# Patient Record
Sex: FEMALE | Race: WHITE | NOT HISPANIC OR LATINO | Employment: FULL TIME
[De-identification: names, ages, dates, MRNs, and addresses within clinical notes are randomized per-mention and may not be internally consistent; named-entity substitution may affect disease eponyms.]

---

## 2017-07-01 ENCOUNTER — HEALTH MAINTENANCE LETTER (OUTPATIENT)
Age: 37
End: 2017-07-01

## 2017-08-08 ENCOUNTER — APPOINTMENT (OUTPATIENT)
Dept: LAB | Facility: CLINIC | Age: 37
End: 2017-08-08
Attending: NURSE PRACTITIONER
Payer: COMMERCIAL

## 2017-08-08 ENCOUNTER — OFFICE VISIT (OUTPATIENT)
Dept: OBGYN | Facility: CLINIC | Age: 37
End: 2017-08-08
Attending: NURSE PRACTITIONER
Payer: COMMERCIAL

## 2017-08-08 VITALS — DIASTOLIC BLOOD PRESSURE: 87 MMHG | SYSTOLIC BLOOD PRESSURE: 133 MMHG | HEART RATE: 72 BPM

## 2017-08-08 DIAGNOSIS — Z11.3 SCREEN FOR STD (SEXUALLY TRANSMITTED DISEASE): Primary | ICD-10-CM

## 2017-08-08 LAB
HIV 1+2 AB+HIV1 P24 AG SERPL QL IA: NORMAL
T PALLIDUM IGG+IGM SER QL: NEGATIVE

## 2017-08-08 PROCEDURE — 87389 HIV-1 AG W/HIV-1&-2 AB AG IA: CPT | Performed by: NURSE PRACTITIONER

## 2017-08-08 PROCEDURE — 87491 CHLMYD TRACH DNA AMP PROBE: CPT | Performed by: NURSE PRACTITIONER

## 2017-08-08 PROCEDURE — 86780 TREPONEMA PALLIDUM: CPT | Performed by: NURSE PRACTITIONER

## 2017-08-08 PROCEDURE — 87591 N.GONORRHOEAE DNA AMP PROB: CPT | Performed by: NURSE PRACTITIONER

## 2017-08-08 PROCEDURE — 36415 COLL VENOUS BLD VENIPUNCTURE: CPT | Performed by: NURSE PRACTITIONER

## 2017-08-08 PROCEDURE — 99212 OFFICE O/P EST SF 10 MIN: CPT | Mod: ZF

## 2017-08-08 NOTE — PROGRESS NOTES
SUBJECTIVE:  Kimberly Landry is an 37 year old  Female, , here for STD screen  Denies any symptoms. Reports  and her  a year ago and now back together.   Pap 2014 WNL.Negative HPV. Annual     Menstrual History:  Menstrual History 2011   LAST MENSTRUAL PERIOD 10/6/2011 10/6/2011 2017       Past Medical History:   Diagnosis Date     Depression     intermittent throughout life without addressing it     IUD (intrauterine device) in place 2012    Mirena     Menarche age 16        OBJECTIVE:   /87 (BP Location: Left arm, Patient Position: Chair, Cuff Size: Adult Regular)  Pulse 72  LMP 2017 (Approximate)     She appears well, afebrile.    Pelvic Exam:  EG/BUS: Normal genitalia and Bartholin's, Urethra, [place]'s normal    Vagina: moist, pink, rugae with creamy, white and odorlesssecretions  Cervix: no lesions      ASSESSMENT:   Encounter Diagnosis   Name Primary?     Screen for STD (sexually transmitted disease) Yes        PLAN:   Orders Placed This Encounter   Procedures     Anti Treponema     HIV Antigen Antibody Combo     GC and Chlamydia culture.     [facility]    Alessandra GILMAN

## 2017-08-08 NOTE — NURSING NOTE
Chief Complaint   Patient presents with     Consult For     STD screening       Meenakshi Shahid, SHERLEY 8/8/2017

## 2017-08-08 NOTE — MR AVS SNAPSHOT
After Visit Summary   8/8/2017    Kimberly Landry    MRN: [id number]           Patient Information     Date Of Birth          1980        Visit Information        Provider Department      8/8/2017 9:00 AM Alessandra Sanchez APRN [facility]        Today's Diagnoses     Screen for STD (sexually transmitted disease)    -  1       Follow-ups after your visit        Your next 10 appointments already scheduled     Aug 08, 2017  9:45 AM CDT   LAB with OP LAB UR   [facility], [place], Laboratory Services ([facility])    [address]  [place] [address] [phone]           Patient must bring picture ID. Patient should be prepared to give a urine specimen  Please do not eat 10-12 hours before your appointment if you are coming in fasting for labs on lipids, cholesterol, or glucose (sugar). Pregnant women should follow their Care Team instructions. Water with medications is okay. Do not drink coffee or other fluids. If you have concerns about taking  your medications, please ask at office or if scheduling via [employer], send a message by clicking on Secure Messaging, Message Your Care Team.              Who to contact     Please call your clinic at [phone] to:    Ask questions about your health    Make or cancel appointments    Discuss your medicines    Learn about your test results    Speak to your doctor   If you have compliments or concerns about an experience at your clinic, or if you wish to file a complaint, please contact [facility] Patient Relations at [phone] or email us at [email or web address]         Additional Information About Your Visit        [employer] Information     [employer] gives you secure access to your electronic health record. If you see a primary care provider, you can also send messages to your care team and make appointments. If you have questions, please call  your primary care clinic.  If you do not have a primary care provider, please call [phone] and they will assist you.      [employer] is an electronic gateway that provides easy, online access to your medical records. With [employer], you can request a clinic appointment, read your test results, renew a prescription or communicate with your care team.     To access your existing account, please contact your [facility] or call [phone] for assistance.        Care EveryWhere ID     This is your Care EveryWhere ID. This could be used by other organizations to access your [place] medical records  [id number]        Your Vitals Were     Pulse Last Period                72 08/02/2017 (Approximate)           Blood Pressure from Last 3 Encounters:   08/08/17 133/87   10/31/16 109/69   01/09/14 117/54    Weight from Last 3 Encounters:   10/31/16 70.3 kg (155 lb)   01/09/14 67.4 kg (148 lb 11.2 oz)   04/15/13 65 kg (143 lb 6.4 oz)              We Performed the Following     Anti Treponema     Chlamydia trachomatis PCR (Clinic Collect)     HIV Antigen Antibody Combo     Neisseria gonorrhoeae PCR        Primary Care Provider    Physician No Ref-Primary       No address on file        Equal Access to Services     ERIC MONSIVAIS : Hadii praful barbao Cory, waaxda luqadaha, qaybta kaalmada adebladimiryada, missy valdes. So [facility] [place] [phone].    ATENCIÓN: Si habla español, tiene a diop disposición servicios gratuitos de asistencia lingüística. Llame al [phone].    We comply with applicable federal civil rights laws and [place] laws. We do not discriminate on the basis of race, color, national origin, age, disability sex, sexual orientation or gender identity.            Thank you!     Thank you for choosing [facility]  for your care. Our goal is always to provide you with excellent care. Hearing back from our patients is one way we can continue  to improve our services. Please take a few minutes to complete the written survey that you may receive in the mail after your visit with us. Thank you!             Your Updated Medication List - Protect others around you: Learn how to safely use, store and throw away your medicines at www.disposemymeds.org.      Notice  As of 8/8/2017  9:44 AM    You have not been prescribed any medications.

## 2017-08-08 NOTE — LETTER
2017       RE: Kimberly Landry  [address] [place] [place] [facility] [address]     Dear Colleague,    Thank you for referring your patient, Kimberly Landry, to the [facility] at [facility]. Please see a copy of my visit note below.      SUBJECTIVE:  Kimberly Landry is an 37 year old  Female, , here for STD screen  Denies any symptoms. Reports  and her  a year ago and now back together.   Pap 2014 WNL.Negative HPV. Annual     Menstrual History:  Menstrual History 2011   LAST MENSTRUAL PERIOD 10/6/2011 10/6/2011 2017       Past Medical History:   Diagnosis Date     Depression     intermittent throughout life without addressing it     IUD (intrauterine device) in place 2012    Mirena     Menarche age 16        OBJECTIVE:   /87 (BP Location: Left arm, Patient Position: Chair, Cuff Size: Adult Regular)  Pulse 72  LMP 2017 (Approximate)     She appears well, afebrile.    Pelvic Exam:  EG/BUS: Normal genitalia and Bartholin's, Urethra, [place]'s normal    Vagina: moist, pink, rugae with creamy, white and odorlesssecretions  Cervix: no lesions      ASSESSMENT:   Encounter Diagnosis   Name Primary?     Screen for STD (sexually transmitted disease) Yes        PLAN:   Orders Placed This Encounter   Procedures     Anti Treponema     HIV Antigen Antibody Combo     GC and Chlamydia culture.     [facility]    Alessandra GILMAN

## 2017-08-09 LAB
C TRACH DNA SPEC QL NAA+PROBE: NORMAL
N GONORRHOEA DNA SPEC QL NAA+PROBE: NORMAL
SPECIMEN SOURCE: NORMAL
SPECIMEN SOURCE: NORMAL

## 2017-08-10 NOTE — PROGRESS NOTES
Dear Kimberly.    Your STD screening results were all negative.    Sincerely,    Alessandra GILMAN

## 2018-03-06 ENCOUNTER — TRANSFERRED RECORDS (OUTPATIENT)
Dept: HEALTH INFORMATION MANAGEMENT | Facility: CLINIC | Age: 38
End: 2018-03-06

## 2019-10-03 ENCOUNTER — HEALTH MAINTENANCE LETTER (OUTPATIENT)
Age: 39
End: 2019-10-03

## 2020-11-07 ENCOUNTER — HEALTH MAINTENANCE LETTER (OUTPATIENT)
Age: 40
End: 2020-11-07

## 2020-12-08 DIAGNOSIS — M34.1 CREST SYNDROME (H): Primary | ICD-10-CM

## 2020-12-23 ENCOUNTER — HOSPITAL ENCOUNTER (OUTPATIENT)
Dept: CARDIAC REHAB | Facility: CLINIC | Age: 40
End: 2020-12-23
Attending: INTERNAL MEDICINE
Payer: COMMERCIAL

## 2020-12-23 DIAGNOSIS — M34.1 CREST SYNDROME (H): ICD-10-CM

## 2020-12-23 PROCEDURE — 94726 PLETHYSMOGRAPHY LUNG VOLUMES: CPT

## 2020-12-23 PROCEDURE — 94729 DIFFUSING CAPACITY: CPT

## 2020-12-23 PROCEDURE — 94060 EVALUATION OF WHEEZING: CPT

## 2020-12-23 PROCEDURE — 999N000160 HC STATISTIC RESPIRATORY TESTING VISIT

## 2021-01-04 LAB
DLCOUNC-%PRED-PRE: 104 %
DLCOUNC-PRE: 27.31 ML/MIN/MMHG
DLCOUNC-PRED: 26.15 ML/MIN/MMHG
ERV-%PRED-PRE: 123 %
ERV-PRE: 1.66 L
ERV-PRED: 1.35 L
EXPTIME-PRE: 6.45 SEC
FEF2575-%PRED-POST: 90 %
FEF2575-%PRED-PRE: 68 %
FEF2575-POST: 3.23 L/SEC
FEF2575-PRE: 2.45 L/SEC
FEF2575-PRED: 3.57 L/SEC
FEFMAX-%PRED-PRE: 97 %
FEFMAX-PRE: 7.66 L/SEC
FEFMAX-PRED: 7.85 L/SEC
FEV1-%PRED-PRE: 98 %
FEV1-PRE: 3.54 L
FEV1FEV6-PRE: 70 %
FEV1FEV6-PRED: 84 %
FEV1FVC-PRE: 70 %
FEV1FVC-PRED: 82 %
FEV1SVC-PRE: 70 %
FEV1SVC-PRED: 82 %
FIFMAX-PRE: 6.88 L/SEC
FRCPLETH-%PRED-PRE: 136 %
FRCPLETH-PRE: 4.07 L
FRCPLETH-PRED: 2.99 L
FVC-%PRED-PRE: 114 %
FVC-PRE: 5.08 L
FVC-PRED: 4.44 L
IC-%PRED-PRE: 111 %
IC-PRE: 3.38 L
IC-PRED: 3.04 L
RVPLETH-%PRED-PRE: 131 %
RVPLETH-PRE: 2.41 L
RVPLETH-PRED: 1.84 L
TLCPLETH-%PRED-PRE: 127 %
TLCPLETH-PRE: 7.45 L
TLCPLETH-PRED: 5.86 L
VA-%PRED-PRE: 111 %
VA-PRE: 6.86 L
VC-%PRED-PRE: 114 %
VC-PRE: 5.04 L
VC-PRED: 4.39 L

## 2021-03-15 ENCOUNTER — IMMUNIZATION (OUTPATIENT)
Dept: NURSING | Facility: CLINIC | Age: 41
End: 2021-03-15
Payer: COMMERCIAL

## 2021-03-15 PROCEDURE — 91300 PR COVID VAC PFIZER DIL RECON 30 MCG/0.3 ML IM: CPT

## 2021-03-15 PROCEDURE — 0001A PR COVID VAC PFIZER DIL RECON 30 MCG/0.3 ML IM: CPT

## 2021-04-05 ENCOUNTER — IMMUNIZATION (OUTPATIENT)
Dept: NURSING | Facility: CLINIC | Age: 41
End: 2021-04-05
Attending: INTERNAL MEDICINE
Payer: COMMERCIAL

## 2021-04-05 PROCEDURE — 0002A PR COVID VAC PFIZER DIL RECON 30 MCG/0.3 ML IM: CPT

## 2021-04-05 PROCEDURE — 91300 PR COVID VAC PFIZER DIL RECON 30 MCG/0.3 ML IM: CPT

## 2021-09-05 ENCOUNTER — HEALTH MAINTENANCE LETTER (OUTPATIENT)
Age: 41
End: 2021-09-05

## 2021-12-26 ENCOUNTER — HEALTH MAINTENANCE LETTER (OUTPATIENT)
Age: 41
End: 2021-12-26

## 2022-02-14 ENCOUNTER — TRANSFERRED RECORDS (OUTPATIENT)
Dept: HEALTH INFORMATION MANAGEMENT | Facility: CLINIC | Age: 42
End: 2022-02-14

## 2022-02-16 ENCOUNTER — MEDICAL CORRESPONDENCE (OUTPATIENT)
Dept: HEALTH INFORMATION MANAGEMENT | Facility: CLINIC | Age: 42
End: 2022-02-16
Payer: COMMERCIAL

## 2022-03-03 ENCOUNTER — TRANSCRIBE ORDERS (OUTPATIENT)
Dept: OTHER | Age: 42
End: 2022-03-03

## 2022-03-03 DIAGNOSIS — I27.20 PULMONARY HYPERTENSION (H): Primary | ICD-10-CM

## 2022-08-18 ENCOUNTER — MEDICAL CORRESPONDENCE (OUTPATIENT)
Dept: HEALTH INFORMATION MANAGEMENT | Facility: CLINIC | Age: 42
End: 2022-08-18

## 2022-10-05 ENCOUNTER — ANCILLARY PROCEDURE (OUTPATIENT)
Dept: CT IMAGING | Facility: CLINIC | Age: 42
End: 2022-10-05
Attending: INTERNAL MEDICINE
Payer: COMMERCIAL

## 2022-10-05 DIAGNOSIS — M34.1 CREST SYNDROME (H): ICD-10-CM

## 2022-10-05 DIAGNOSIS — I27.20 PULMONARY HYPERTENSION (H): ICD-10-CM

## 2022-10-05 PROCEDURE — 94375 RESPIRATORY FLOW VOLUME LOOP: CPT | Performed by: INTERNAL MEDICINE

## 2022-10-05 PROCEDURE — 71250 CT THORAX DX C-: CPT | Mod: GC | Performed by: RADIOLOGY

## 2022-10-05 PROCEDURE — 94726 PLETHYSMOGRAPHY LUNG VOLUMES: CPT | Performed by: INTERNAL MEDICINE

## 2022-10-05 PROCEDURE — 94729 DIFFUSING CAPACITY: CPT | Performed by: INTERNAL MEDICINE

## 2022-10-06 LAB
DLCOUNC-%PRED-PRE: 122 %
DLCOUNC-PRE: 31.83 ML/MIN/MMHG
DLCOUNC-PRED: 25.97 ML/MIN/MMHG
ERV-%PRED-PRE: 147 %
ERV-PRE: 1.95 L
ERV-PRED: 1.33 L
EXPTIME-PRE: 6.92 SEC
FEF2575-%PRED-PRE: 75 %
FEF2575-PRE: 2.63 L/SEC
FEF2575-PRED: 3.47 L/SEC
FEFMAX-%PRED-PRE: 93 %
FEFMAX-PRE: 7.32 L/SEC
FEFMAX-PRED: 7.82 L/SEC
FEV1-%PRED-PRE: 103 %
FEV1-PRE: 3.64 L
FEV1FEV6-PRE: 72 %
FEV1FEV6-PRED: 84 %
FEV1FVC-PRE: 71 %
FEV1FVC-PRED: 81 %
FEV1SVC-PRE: 73 %
FEV1SVC-PRED: 81 %
FIFMAX-PRE: 6.14 L/SEC
FRCPLETH-%PRED-PRE: 126 %
FRCPLETH-PRE: 3.79 L
FRCPLETH-PRED: 3 L
FVC-%PRED-PRE: 116 %
FVC-PRE: 5.13 L
FVC-PRED: 4.39 L
IC-%PRED-PRE: 100 %
IC-PRE: 3.05 L
IC-PRED: 3.02 L
RVPLETH-%PRED-PRE: 98 %
RVPLETH-PRE: 1.84 L
RVPLETH-PRED: 1.87 L
TLCPLETH-%PRED-PRE: 116 %
TLCPLETH-PRE: 6.83 L
TLCPLETH-PRED: 5.86 L
VA-%PRED-PRE: 107 %
VA-PRE: 6.6 L
VC-%PRED-PRE: 114 %
VC-PRE: 5 L
VC-PRED: 4.35 L

## 2022-10-23 ENCOUNTER — HEALTH MAINTENANCE LETTER (OUTPATIENT)
Age: 42
End: 2022-10-23

## 2022-12-29 ENCOUNTER — ANCILLARY PROCEDURE (OUTPATIENT)
Dept: CARDIOLOGY | Facility: CLINIC | Age: 42
End: 2022-12-29
Attending: INTERNAL MEDICINE
Payer: COMMERCIAL

## 2022-12-29 DIAGNOSIS — M34.1 CREST SYNDROME (H): ICD-10-CM

## 2022-12-29 LAB — LVEF ECHO: NORMAL

## 2022-12-29 PROCEDURE — 93306 TTE W/DOPPLER COMPLETE: CPT | Performed by: INTERNAL MEDICINE

## 2023-04-02 ENCOUNTER — HEALTH MAINTENANCE LETTER (OUTPATIENT)
Age: 43
End: 2023-04-02

## 2023-11-16 ENCOUNTER — OFFICE VISIT (OUTPATIENT)
Dept: OBGYN | Facility: CLINIC | Age: 43
End: 2023-11-16
Attending: ADVANCED PRACTICE MIDWIFE
Payer: COMMERCIAL

## 2023-11-16 VITALS
HEIGHT: 70 IN | HEART RATE: 102 BPM | SYSTOLIC BLOOD PRESSURE: 119 MMHG | BODY MASS INDEX: 24.05 KG/M2 | DIASTOLIC BLOOD PRESSURE: 88 MMHG | WEIGHT: 168 LBS

## 2023-11-16 DIAGNOSIS — N76.0 VAGINITIS AND VULVOVAGINITIS: Primary | ICD-10-CM

## 2023-11-16 LAB
ALBUMIN UR-MCNC: NEGATIVE MG/DL
APPEARANCE UR: CLEAR
BACTERIAL VAGINOSIS VAG-IMP: NEGATIVE
BILIRUB UR QL STRIP: NEGATIVE
CANDIDA DNA VAG QL NAA+PROBE: NOT DETECTED
CANDIDA GLABRATA / CANDIDA KRUSEI DNA: NOT DETECTED
COLOR UR AUTO: YELLOW
GLUCOSE UR STRIP-MCNC: NEGATIVE MG/DL
HGB UR QL STRIP: ABNORMAL
KETONES UR STRIP-MCNC: NEGATIVE MG/DL
LEUKOCYTE ESTERASE UR QL STRIP: NEGATIVE
NITRATE UR QL: NEGATIVE
PH UR STRIP: 7.5 [PH] (ref 5–8)
SP GR UR STRIP: 1.02 (ref 1–1.03)
T VAGINALIS DNA VAG QL NAA+PROBE: NOT DETECTED
UROBILINOGEN UR STRIP-ACNC: 1 E.U./DL

## 2023-11-16 PROCEDURE — 81003 URINALYSIS AUTO W/O SCOPE: CPT | Performed by: ADVANCED PRACTICE MIDWIFE

## 2023-11-16 PROCEDURE — 87086 URINE CULTURE/COLONY COUNT: CPT | Performed by: ADVANCED PRACTICE MIDWIFE

## 2023-11-16 PROCEDURE — 99213 OFFICE O/P EST LOW 20 MIN: CPT | Performed by: ADVANCED PRACTICE MIDWIFE

## 2023-11-16 PROCEDURE — 99214 OFFICE O/P EST MOD 30 MIN: CPT | Performed by: ADVANCED PRACTICE MIDWIFE

## 2023-11-16 PROCEDURE — 0352U MULTIPLEX VAGINAL PANEL BY PCR: CPT | Performed by: ADVANCED PRACTICE MIDWIFE

## 2023-11-16 RX ORDER — HYDROXYCHLOROQUINE SULFATE 200 MG/1
TABLET, FILM COATED ORAL
COMMUNITY
Start: 2022-11-10

## 2023-11-16 ASSESSMENT — PAIN SCALES - GENERAL: PAINLEVEL: MILD PAIN (2)

## 2023-11-16 NOTE — PROGRESS NOTES
"S: Kimberly is a 43 year old  who presents today for ongoing concerns for vaginitis symptoms. She reports 4 months of itching, burning and irritation. She has tried Monistat treatment x 2. Tried the 3 day and 7 day treatment. She noted that before her last period she had vaginal discharge discoloration-green/yellow with a + odor that she describes vinegary smell. After the period, the odor was less prominent but still present. The symptoms slightly improved but the irritation, burning with urination, itching and general discomfort continue. She reports that currently her discharge is clear.     LMP: 2023. Occurred 2 weeks after last LMP. Unusual but first time this has happened.     Describes urinary frequency and difficulty holding urine. Feels urgency. Has appointment on 23 on the 7th floor for urinary frequency. Denies any issues with bowel movements.     Has one male partner. Has not been able to have regular intercourse due to discomfort. Declines STI screening today.     O:  /88   Pulse 102   Ht 1.778 m (5' 10\")   Wt 76.2 kg (168 lb)   LMP 2023 (Exact Date)   BMI 24.11 kg/m      Urinalysis negative for signs of infection except trace blood that is likely from period.   Patient opted to self collect vaginitis panel sample.     A: Vaginitis    P: Discussed treatment options for yeast and BV depending on results. If yeast will treat with Diflucan 1 tablet every 3 days for 3 total doses. If BV, will treat with Flagyl 500 mg BID x 7 days. Patient is agreeable to that plan.     Discussed strategies at home for improving vaginal health including loose fitting clothes and avoiding tights/yoga pants, ensuring that she cleans up immediately after a workout.   "

## 2023-11-16 NOTE — LETTER
"2023       RE: Kimberly Landry  [address] [facility] [address]     Dear Colleague,    Thank you for referring your patient, Kimberly Landry, to the [facility] [place] at [facility]. Please see a copy of my visit note below.    S: Kimberly is a 43 year old  who presents today for ongoing concerns for vaginitis symptoms. She reports 4 months of itching, burning and irritation. She has tried Monistat treatment x 2. Tried the 3 day and 7 day treatment. She noted that before her last period she had vaginal discharge discoloration-green/yellow with a + odor that she describes vinegary smell. After the period, the odor was less prominent but still present. The symptoms slightly improved but the irritation, burning with urination, itching and general discomfort continue. She reports that currently her discharge is clear.     LMP: 2023. Occurred 2 weeks after last LMP. Unusual but first time this has happened.     Describes urinary frequency and difficulty holding urine. Feels urgency. Has appointment on 23 on the 7th floor for urinary frequency. Denies any issues with bowel movements.     Has one male partner. Has not been able to have regular intercourse due to discomfort. Declines STI screening today.     O:  /88   Pulse 102   Ht 1.778 m (5' 10\")   Wt 76.2 kg (168 lb)   LMP 2023 (Exact Date)   BMI 24.11 kg/m      Urinalysis negative for signs of infection except trace blood that is likely from period.   Patient opted to self collect vaginitis panel sample.     A: Vaginitis    P: Discussed treatment options for yeast and BV depending on results. If yeast will treat with Diflucan 1 tablet every 3 days for 3 total doses. If BV, will treat with Flagyl 500 mg BID x 7 days. Patient is agreeable to that plan.     Discussed strategies at home for improving vaginal health including loose fitting " clothes and avoiding tights/yoga pants, ensuring that she cleans up immediately after a workout.       Coco Trujillo CNM

## 2023-11-16 NOTE — NURSING NOTE
Possible uti-  white discharge-  was yellow before last period with an odor.    Pain and burning with uriantion

## 2023-11-17 LAB — BACTERIA UR CULT: NORMAL

## 2023-12-06 ENCOUNTER — OFFICE VISIT (OUTPATIENT)
Dept: MIDWIFE SERVICES | Facility: CLINIC | Age: 43
End: 2023-12-06
Payer: COMMERCIAL

## 2023-12-06 VITALS
BODY MASS INDEX: 25.33 KG/M2 | HEART RATE: 73 BPM | WEIGHT: 171 LBS | SYSTOLIC BLOOD PRESSURE: 115 MMHG | DIASTOLIC BLOOD PRESSURE: 64 MMHG | TEMPERATURE: 97.2 F | HEIGHT: 69 IN

## 2023-12-06 DIAGNOSIS — Z30.011 ENCOUNTER FOR INITIAL PRESCRIPTION OF CONTRACEPTIVE PILLS: ICD-10-CM

## 2023-12-06 DIAGNOSIS — Z23 NEED FOR PROPHYLACTIC VACCINATION AND INOCULATION AGAINST INFLUENZA: ICD-10-CM

## 2023-12-06 DIAGNOSIS — Z23 HIGH PRIORITY FOR 2019-NCOV VACCINE: ICD-10-CM

## 2023-12-06 DIAGNOSIS — Z12.31 ENCOUNTER FOR SCREENING MAMMOGRAM FOR BREAST CANCER: ICD-10-CM

## 2023-12-06 DIAGNOSIS — Z12.4 SCREENING FOR CERVICAL CANCER: ICD-10-CM

## 2023-12-06 DIAGNOSIS — Z13.29 SCREENING FOR THYROID DISORDER: ICD-10-CM

## 2023-12-06 DIAGNOSIS — Z01.419 WELL WOMAN EXAM WITH ROUTINE GYNECOLOGICAL EXAM: Primary | ICD-10-CM

## 2023-12-06 DIAGNOSIS — Z13.0 SCREENING FOR IRON DEFICIENCY ANEMIA: ICD-10-CM

## 2023-12-06 DIAGNOSIS — Z13.1 SCREENING FOR DIABETES MELLITUS: ICD-10-CM

## 2023-12-06 LAB
ERYTHROCYTE [DISTWIDTH] IN BLOOD BY AUTOMATED COUNT: 12.2 % (ref 10–15)
HBA1C MFR BLD: 5.4 % (ref 0–5.6)
HCT VFR BLD AUTO: 42.4 % (ref 35–47)
HGB BLD-MCNC: 13.6 G/DL (ref 11.7–15.7)
MCH RBC QN AUTO: 31.2 PG (ref 26.5–33)
MCHC RBC AUTO-ENTMCNC: 32.1 G/DL (ref 31.5–36.5)
MCV RBC AUTO: 97 FL (ref 78–100)
PLATELET # BLD AUTO: 280 10E3/UL (ref 150–450)
RBC # BLD AUTO: 4.36 10E6/UL (ref 3.8–5.2)
TSH SERPL DL<=0.005 MIU/L-ACNC: 0.69 UIU/ML (ref 0.3–4.2)
WBC # BLD AUTO: 6.9 10E3/UL (ref 4–11)

## 2023-12-06 PROCEDURE — 91320 SARSCV2 VAC 30MCG TRS-SUC IM: CPT | Performed by: ADVANCED PRACTICE MIDWIFE

## 2023-12-06 PROCEDURE — 84443 ASSAY THYROID STIM HORMONE: CPT | Performed by: ADVANCED PRACTICE MIDWIFE

## 2023-12-06 PROCEDURE — G0145 SCR C/V CYTO,THINLAYER,RESCR: HCPCS | Performed by: ADVANCED PRACTICE MIDWIFE

## 2023-12-06 PROCEDURE — 99386 PREV VISIT NEW AGE 40-64: CPT | Mod: 25 | Performed by: ADVANCED PRACTICE MIDWIFE

## 2023-12-06 PROCEDURE — 36415 COLL VENOUS BLD VENIPUNCTURE: CPT | Performed by: ADVANCED PRACTICE MIDWIFE

## 2023-12-06 PROCEDURE — 85027 COMPLETE CBC AUTOMATED: CPT | Performed by: ADVANCED PRACTICE MIDWIFE

## 2023-12-06 PROCEDURE — 90480 ADMN SARSCOV2 VAC 1/ONLY CMP: CPT | Performed by: ADVANCED PRACTICE MIDWIFE

## 2023-12-06 PROCEDURE — 90686 IIV4 VACC NO PRSV 0.5 ML IM: CPT | Performed by: ADVANCED PRACTICE MIDWIFE

## 2023-12-06 PROCEDURE — 90471 IMMUNIZATION ADMIN: CPT | Performed by: ADVANCED PRACTICE MIDWIFE

## 2023-12-06 PROCEDURE — 83036 HEMOGLOBIN GLYCOSYLATED A1C: CPT | Performed by: ADVANCED PRACTICE MIDWIFE

## 2023-12-06 PROCEDURE — 87624 HPV HI-RISK TYP POOLED RSLT: CPT | Performed by: ADVANCED PRACTICE MIDWIFE

## 2023-12-06 RX ORDER — NORETHINDRONE ACETATE AND ETHINYL ESTRADIOL 1MG-20(21)
1 KIT ORAL DAILY
Qty: 84 TABLET | Refills: 3 | Status: SHIPPED | OUTPATIENT
Start: 2023-12-06

## 2023-12-06 ASSESSMENT — PATIENT HEALTH QUESTIONNAIRE - PHQ9: SUM OF ALL RESPONSES TO PHQ QUESTIONS 1-9: 3

## 2023-12-06 NOTE — PROGRESS NOTES
Kimberly is a 43 year old  female who presents for annual exam.     Menses are irregular and irregular lasting 4 days.  Menses flow: normal and light.  Patient's last menstrual period was 2023 (exact date).. Using none for contraception.  She is not currently considering pregnancy.  Besides routine health maintenance, she would like to discuss uterine/ovarian pain and vaginal discomfort after periods. She often gets yeast like symptoms - discharge, vaginal irritation after her period that can last for weeks to months. Has been tested and everything comes back negative. Also, has significant ovarian pain with ovulation. Has had two Mirena IUDs in the past, after both of her births and did not have a good experience. Not interested in trying again at this time. Considering hysterectomy but not quite ready.   Accepts flu and COVID booster today  GYNECOLOGIC HISTORY:  Menarche: 16  Age at first intercourse: 18 Number of lifetime partners: 3  Kimberly is sexually active with 1male partner(s) and is currently in monogamous relationship.    History sexually transmitted infections:No STD history  STI testing offered?  Declined  ABIOLA exposure: No  History of abnormal Pap smear: NO - age 30- 65 PAP every 3 years recommended  Family history of breast CA: No  Family history of uterine/ovarian CA: No    Family history of colon CA: Yes (Please explain): cousin, uncle    HEALTH MAINTENANCE:  Cholesterol: (  Cholesterol   Date Value Ref Range Status   2010 133 0 - 200 mg/dL Final     Comment:     LDL Cholesterol is the primary guide to therapy.     The NCEP recommends further evaluation of: patients with cholesterol <200   mg/dL   if additional risk factors are present, cholesterol >240 mg/dL, triglycerides   >150 mg/dL, or HDL <40 mg/dL.    History of abnormal lipids: No  Mammo: no . History of abnormal Mammo: No.  Regular Self Breast Exams: No  Calcium/Vitamin D intake: source:  dairy, dietary supplement(s) Adequate?  Yes    TSH   Date Value Ref Range Status   2004 1.30 0.4 - 5.0 mU/L Final     Lab Results   Component Value Date    PAP NIL 2014    PAP NIL 2010       HISTORY:  OB History    Para Term  AB Living   2 2 2 0 0 2   SAB IAB Ectopic Multiple Live Births   0 0 0 0 2      # Outcome Date GA Lbr Eliecer/2nd Weight Sex Delivery Anes PTL Lv   2 Term 12 40w2d 27:25 / 00:12 3.771 kg (8 lb 5 oz) F Vag-Spont None N [place]      Name: YESSENIA SOLIMAN LEONID      Apgar1: 8  Apgar5: 9   1 Term 08 40w0d 02:00 2.92 kg (6 lb 7 oz) F  EPI  [place]      Birth Comments: -no comp      Name: Meri     Past Medical History:   Diagnosis Date    Depression     intermittent throughout life without addressing it    Menarche age 16    Scleroderma (H)      Past Surgical History:   Procedure Laterality Date    FOOT HARDWARE REMOVAL      LASIK      PODIATRY/FOOT & ANKLE SURGERY REFERRAL  2010    arch repair - no problems with anesth.     Family History   Problem Relation Age of Onset    Hypertension Mother     Cancer - colorectal Paternal Uncle         and PC    Alzheimer Disease Paternal Grandmother     Arthritis Paternal Grandfather     Diabetes Paternal Grandfather         DMII and PU    Osteoporosis Mother         Osteopenia     Social History     Socioeconomic History    Marital status:      Spouse name: None    Number of children: 1    Years of education: None    Highest education level: None   Occupational History    Occupation: homemaker   Tobacco Use    Smoking status: Former     Packs/day: 10.00     Years: 4.00     Additional pack years: 0.00     Total pack years: 40.00     Types: Cigarettes     Quit date: 2003     Years since quittin.9    Smokeless tobacco: Never   Vaping Use    Vaping Use: Never used   Substance and Sexual Activity    Alcohol use: Yes     Alcohol/week: 0.0 standard drinks of alcohol     Comment: before pregnancy 2/beers on weekend, no hard liquor,  currently none     Drug use: No    Sexual activity: Yes     Partners: Male     Birth control/protection: Condom, Male Surgical     Comment: Mirena   Other Topics Concern     Service No    Blood Transfusions No    Caffeine Concern No    Occupational Exposure No    Hobby Hazards Yes     Comment:  - worried about dehydration/heat    Sleep Concern No    Stress Concern No    Weight Concern No    Special Diet No    Back Care No    Exercise Yes     Comment: work quite physical    Bike Helmet Yes    Seat Belt Yes    Self-Exams No   Social History Narrative    How much exercise per week? Fair amount    How much calcium per day? Milk        How much caffeine per day? 1 cup coffee    How much vitamin D per day? In the milk    Do you/your family wear seatbelts?  Yes    Do you/your family use safety helmets? Yes    Do you/your family use sunscreen? Yes    Do you/your family keep firearms in the home? No    Do you/your family have a smoke detector(s)? Yes        Do you feel safe in your home? Yes    Has anyone ever touched you in an unwanted manner? No     Explain     SEE SHERLEY GARCIA    1/9/2014        Reviewed Michael Trujillo MA     10/31/16       Current Outpatient Medications:     norethindrone-ethinyl estradiol (JUNEL FE 1/20) 1-20 MG-MCG tablet, Take 1 tablet by mouth daily, Disp: 84 tablet, Rfl: 3    hydroxychloroquine (PLAQUENIL) 200 MG tablet, , Disp: , Rfl:      Allergies   Allergen Reactions    Penicillins Unknown and Hives       Past medical, surgical, social and family history were reviewed and updated in EPIC.    ROS:   C:     NEGATIVE for fever, chills, change in weight  I:       NEGATIVE for worrisome rashes, moles or lesions  E:     NEGATIVE for vision changes or irritation  E/M: NEGATIVE for ear, mouth and throat problems  R:     NEGATIVE for significant cough or SOB  CV:   NEGATIVE for chest pain, palpitations or peripheral edema  GI:     NEGATIVE for nausea, abdominal pain, heartburn, or change in bowel  "habits  :   NEGATIVE for frequency, dysuria, hematuria, vaginal discharge, or irregular bleeding  M:     NEGATIVE for significant arthralgias or myalgia  N:      NEGATIVE for weakness, dizziness or paresthesias  E:      NEGATIVE for temperature intolerance, skin/hair changes  P:      NEGATIVE for changes in mood or affect.    EXAM:  /64   Pulse 73   Temp 97.2  F (36.2  C)   Ht 1.753 m (5' 9\")   Wt 77.6 kg (171 lb)   LMP 12/03/2023 (Exact Date)   BMI 25.25 kg/m     BMI: Body mass index is 25.25 kg/m .  Constitutional: healthy, alert and no distress  Head: Normocephalic. No masses, lesions, tenderness or abnormalities  Neck: Neck supple. Trachea midline. No adenopathy. Thyroid symmetric, normal size.   Cardiovascular: RRR.   Respiratory: Negative.   Breast: Breasts reveal mild symmetric fibrocystic densities, but there are no dominant, discrete, fixed or suspicious masses found.  Gastrointestinal: Abdomen soft, non-tender, non-distended. No masses, organomegaly.  :  Vulva:  No external lesions, normal female hair distribution, no inguinal adenopathy.    Urethra:  Midline, non-tender, well supported, no discharge  Vagina:  Moist, pink, no abnormal discharge, no lesions  Cervix: Smooth, pink, no visible lesions  Musculoskeletal: extremities normal  Skin: no suspicious lesions or rashes  Psychiatric: Affect appropriate, cooperative,mentation appears normal.     COUNSELING:    reports that she quit smoking about 19 years ago. Her smoking use included cigarettes. She has a 40.00 pack-year smoking history. She has never used smokeless tobacco.    Body mass index is 25.25 kg/m .    ASSESSMENT/PLAN:  43 year old female with satisfactory annual exam  (Z01.419) Well woman exam with routine gynecological exam  (primary encounter diagnosis)  Comment:   Plan:     (Z23) Need for prophylactic vaccination and inoculation against influenza  Comment:   Plan: INFLUENZA VACCINE IM > 6 MONTHS VALENT IIV4         " (AFLURIA/FLUZONE)            (Z23) High priority for 2019-nCoV vaccine  Comment:   Plan: COVID-19 12+ (2023-24) (PFIZER)            (Z13.0) Screening for iron deficiency anemia  Comment:   Plan: CBC with platelets            (Z13.29) Screening for thyroid disorder  Comment:   Plan: TSH with free T4 reflex            (Z13.1) Screening for diabetes mellitus  Comment:   Plan: Hemoglobin A1c            (Z12.31) Encounter for screening mammogram for breast cancer  Comment:   Plan: *MA Screening Digital Bilateral            (Z12.4) Screening for cervical cancer  Comment:   Plan: Pap imaged thin layer screen with HPV -         recommended age 30 - 65 years (select HPV order        below)            (Z30.011) Encounter for initial prescription of contraceptive pills  Comment: Discussed OCP as means of controlling cycle and managing pain associated with period. Pt has tried multiple IUDs in the past and felt it worsened her period symptoms so not interested in this as an option at this time. Ending period today so will  and start tonight.   Plan: norethindrone-ethinyl estradiol (JUNEL FE 1/20)        1-20 MG-MCG tablet       Discussed nonspecific vaginitis and options for treatment. Discussed vaginal clinda vs external vaginal estrogen as options. Pt is not currently having symptoms so will monitor for now. Encouraged to reach out if symptoms return.      Will notify patient of lab results via [employer]  RTC yearly for annual exam or sooner prbetsey Wood CNM

## 2023-12-08 LAB
BKR LAB AP GYN ADEQUACY: NORMAL
BKR LAB AP GYN INTERPRETATION: NORMAL
BKR LAB AP HPV REFLEX: NORMAL
BKR LAB AP PREVIOUS ABNORMAL: NORMAL
PATH REPORT.COMMENTS IMP SPEC: NORMAL
PATH REPORT.COMMENTS IMP SPEC: NORMAL
PATH REPORT.RELEVANT HX SPEC: NORMAL

## 2023-12-11 LAB
HUMAN PAPILLOMA VIRUS 16 DNA: NEGATIVE
HUMAN PAPILLOMA VIRUS 18 DNA: NEGATIVE
HUMAN PAPILLOMA VIRUS FINAL DIAGNOSIS: NORMAL
HUMAN PAPILLOMA VIRUS OTHER HR: NEGATIVE

## 2023-12-13 ENCOUNTER — MYC MEDICAL ADVICE (OUTPATIENT)
Dept: MIDWIFE SERVICES | Facility: CLINIC | Age: 43
End: 2023-12-13
Payer: COMMERCIAL

## 2024-01-08 ENCOUNTER — ANCILLARY PROCEDURE (OUTPATIENT)
Dept: MAMMOGRAPHY | Facility: CLINIC | Age: 44
End: 2024-01-08
Attending: ADVANCED PRACTICE MIDWIFE
Payer: COMMERCIAL

## 2024-01-08 DIAGNOSIS — Z12.31 ENCOUNTER FOR SCREENING MAMMOGRAM FOR BREAST CANCER: ICD-10-CM

## 2024-01-08 PROCEDURE — 77063 BREAST TOMOSYNTHESIS BI: CPT | Mod: GC | Performed by: STUDENT IN AN ORGANIZED HEALTH CARE EDUCATION/TRAINING PROGRAM

## 2024-01-08 PROCEDURE — 77067 SCR MAMMO BI INCL CAD: CPT | Mod: GC | Performed by: STUDENT IN AN ORGANIZED HEALTH CARE EDUCATION/TRAINING PROGRAM

## 2025-01-26 ENCOUNTER — HEALTH MAINTENANCE LETTER (OUTPATIENT)
Age: 45
End: 2025-01-26